# Patient Record
Sex: FEMALE | Race: WHITE | NOT HISPANIC OR LATINO | Employment: OTHER | URBAN - METROPOLITAN AREA
[De-identification: names, ages, dates, MRNs, and addresses within clinical notes are randomized per-mention and may not be internally consistent; named-entity substitution may affect disease eponyms.]

---

## 2017-04-20 ENCOUNTER — APPOINTMENT (OUTPATIENT)
Dept: WOMENS IMAGING | Facility: HOSPITAL | Age: 57
End: 2017-04-20

## 2017-04-20 PROCEDURE — 77067 SCR MAMMO BI INCL CAD: CPT | Performed by: RADIOLOGY

## 2017-04-20 PROCEDURE — 77063 BREAST TOMOSYNTHESIS BI: CPT | Performed by: RADIOLOGY

## 2018-08-06 ENCOUNTER — APPOINTMENT (OUTPATIENT)
Dept: WOMENS IMAGING | Facility: HOSPITAL | Age: 58
End: 2018-08-06

## 2018-08-06 PROCEDURE — 77067 SCR MAMMO BI INCL CAD: CPT | Performed by: RADIOLOGY

## 2018-08-06 PROCEDURE — 77063 BREAST TOMOSYNTHESIS BI: CPT | Performed by: RADIOLOGY

## 2019-04-08 ENCOUNTER — OFFICE VISIT (OUTPATIENT)
Dept: NEUROSURGERY | Facility: CLINIC | Age: 59
End: 2019-04-08

## 2019-04-08 VITALS
BODY MASS INDEX: 28.45 KG/M2 | SYSTOLIC BLOOD PRESSURE: 114 MMHG | DIASTOLIC BLOOD PRESSURE: 58 MMHG | WEIGHT: 177 LBS | TEMPERATURE: 98.1 F | HEIGHT: 66 IN

## 2019-04-08 DIAGNOSIS — M51.36 DEGENERATIVE DISC DISEASE, LUMBAR: Primary | ICD-10-CM

## 2019-04-08 PROBLEM — M51.369 DEGENERATIVE DISC DISEASE, LUMBAR: Status: ACTIVE | Noted: 2019-04-08

## 2019-04-08 PROCEDURE — 99203 OFFICE O/P NEW LOW 30 MIN: CPT | Performed by: NEUROLOGICAL SURGERY

## 2019-04-08 RX ORDER — NABUMETONE 750 MG/1
750 TABLET, FILM COATED ORAL 2 TIMES DAILY
Qty: 60 TABLET | Refills: 0 | Status: SHIPPED | OUTPATIENT
Start: 2019-04-08 | End: 2020-01-06

## 2019-04-08 RX ORDER — TRAZODONE HYDROCHLORIDE 150 MG/1
150 TABLET ORAL NIGHTLY
COMMUNITY

## 2019-04-08 RX ORDER — METHOCARBAMOL 750 MG/1
750 TABLET, FILM COATED ORAL 2 TIMES DAILY
Qty: 30 TABLET | Refills: 0 | Status: SHIPPED | OUTPATIENT
Start: 2019-04-08 | End: 2019-04-26 | Stop reason: SDUPTHER

## 2019-04-08 RX ORDER — ZOLEDRONIC ACID 5 MG/100ML
5 INJECTION, SOLUTION INTRAVENOUS ONCE
COMMUNITY

## 2019-04-08 RX ORDER — HYDROCODONE BITARTRATE AND ACETAMINOPHEN 7.5; 325 MG/1; MG/1
1 TABLET ORAL EVERY 6 HOURS PRN
COMMUNITY

## 2019-04-08 NOTE — PATIENT INSTRUCTIONS
After seeing Dr. Martinez:     Call Dr. Cheatham on a Monday or Tuesday with an update.    Ask for Tessa () and leave a message for  Dr. Cheatham.   He will call you back at the end of the day as soon as he can.     [phone]

## 2019-04-08 NOTE — PROGRESS NOTES
Lexi Martinez  1960  [id number]      Chief Complaint   Patient presents with   • Back Pain       HISTORY OF PRESENT ILLNESS:  [This is a 59-year-old female with chronic back problems.  She has had an exacerbation several months ago when she drove back from [place] with severe pain in her right hip rating to her right lower extremity.  Since that time she has been to physical therapy without improvement.  Diagnostic studies were performed and she is referred for neurosurgical consultation.  The description of the pain is nonradicular and that it is nondermatomal.]    Past Medical History:   Diagnosis Date   • Osteoporosis        Past Surgical History:   Procedure Laterality Date   • KNEE SURGERY Right        Family History   Problem Relation Age of Onset   • Cancer Mother    • Heart disease Father        Social History     Socioeconomic History   • Marital status:      Spouse name: Not on file   • Number of children: Not on file   • Years of education: Not on file   • Highest education level: Not on file   Tobacco Use   • Smoking status: Current Every Day Smoker     Packs/day: 1.00     Types: Cigarettes   • Smokeless tobacco: Never Used   Substance and Sexual Activity   • Alcohol use: No     Frequency: Never   • Drug use: No   • Sexual activity: Defer       Allergies   Allergen Reactions   • Macrobid [Nitrofurantoin Macrocrystal] Hives   • Ultram [Tramadol] GI Intolerance         Current Outpatient Medications:   •  HYDROcodone-acetaminophen (NORCO) 7.5-325 MG per tablet, Take 1 tablet by mouth Every 6 (Six) Hours As Needed for Moderate Pain ., Disp: , Rfl:   •  Pitavastatin Calcium (LIVALO) 4 MG tablet, Take  by mouth Every Night., Disp: , Rfl:   •  traZODone (DESYREL) 150 MG tablet, Take 150 mg by mouth Every Night., Disp: , Rfl:   •  zoledronic acid (RECLAST) 5 MG/100ML solution infusion, Infuse 5 mg into a venous catheter 1 (One) Time., Disp: , Rfl:     Review of Systems   Constitutional: Negative for  activity change, appetite change, chills, diaphoresis, fatigue, fever and unexpected weight change.   HENT: Negative for congestion, dental problem, drooling, ear discharge, ear pain, facial swelling, hearing loss, mouth sores, nosebleeds, postnasal drip, rhinorrhea, sinus pressure, sneezing, sore throat, tinnitus, trouble swallowing and voice change.    Eyes: Negative for photophobia, pain, discharge, redness, itching and visual disturbance.   Respiratory: Negative for apnea, cough, choking, chest tightness, shortness of breath, wheezing and stridor.    Cardiovascular: Negative for chest pain, palpitations and leg swelling.   Gastrointestinal: Negative for abdominal distention, abdominal pain, anal bleeding, blood in stool, constipation, diarrhea, nausea, rectal pain and vomiting.   Endocrine: Negative for cold intolerance, heat intolerance, polydipsia, polyphagia and polyuria.   Genitourinary: Negative for decreased urine volume, difficulty urinating, dysuria, enuresis, flank pain, frequency, genital sores, hematuria and urgency.   Musculoskeletal: Positive for back pain. Negative for arthralgias, gait problem, joint swelling, myalgias, neck pain and neck stiffness.   Skin: Negative for color change, pallor, rash and wound.   Allergic/Immunologic: Negative for environmental allergies, food allergies and immunocompromised state.   Neurological: Negative for dizziness, tremors, seizures, syncope, facial asymmetry, speech difficulty, weakness, light-headedness, numbness and headaches.   Hematological: Negative for adenopathy. Does not bruise/bleed easily.   Psychiatric/Behavioral: Negative for agitation, behavioral problems, confusion, decreased concentration, dysphoric mood, hallucinations, self-injury, sleep disturbance and suicidal ideas. The patient is not nervous/anxious and is not hyperactive.        Vitals:    04/08/19 1150   BP: 114/58   BP Location: Right arm   Patient Position: Sitting   Temp: 98.1 °F (36.7  "°C)   TempSrc: Temporal   Weight: 80.3 kg (177 lb)   Height: 167.6 cm (66\")       Neurological Examination:    Mental status/speech: The patient is alert and oriented.  Speech is clear without aphysia or dysarthria.  No overt cognitive deficits.    Cranial nerve examination:    Olfaction: Smell is intact.  Vision: Vision is intact without visual field abnormalities.  Funduscopic examination is normal.  No pupillary irregularity.  Ocular motor examination: The extraocular muscles are intact.  There is no diplopia.  The pupil is round and reactive to both light and accommodation.  There is no nystagmus.  Facial movement/sensation: There is no facial weakness.  Sensation is intact in the first, second, and third divisions of the trigeminal nerve.  The corneal reflex is intact.  Auditory: Hearing is intact to finger rub bilaterally.  Cranial nerves IX, X, XI, XII: Phonation is normal.  No dysphagia.  Tongue is protruded in the midline without atrophy.  The gag reflex is intact.  Shoulder shrug is normal.    Musculoligamentous ligamentous examination: Straight leg raising is negative.  She has pain with internal/external rotation of the right hip.  There is no weakness, sensory loss or reflex asymmetry.  Her balance is intact without ataxia or dysmetria.  [ ]          Medical Decision Making:     Diagnostic Data Set: Lumbar spine x-rays show significant degenerative disc disease L3-L4 and L4-L5.  Unfortunately the MRI was not sent.    Assessment:  [Symptomatic degenerative osteoarthritis]          Recommendations:  [We will request that the MRI be sent to us.  In interim I have given her prescription of Relafen 750 mg twice daily, Robaxin-750 milligrams at night and have asked her to see Dr. Martinez for an epidural steroid injection.  I will review her diagnostic studies and call her.        I greatly appreciate the opportunity to see and evaluate this individual.  If you have questions or concerns regarding issues that " I may have overlooked please call me at any time: [phone].  Tee Cheatham M.D.  [facility]  [address].  [place] [id number]

## 2019-04-22 ENCOUNTER — TELEPHONE (OUTPATIENT)
Dept: NEUROSURGERY | Facility: CLINIC | Age: 59
End: 2019-04-22

## 2019-04-22 ENCOUNTER — DOCUMENTATION (OUTPATIENT)
Dept: NEUROSURGERY | Facility: CLINIC | Age: 59
End: 2019-04-22

## 2019-04-22 NOTE — PROGRESS NOTES
Neurosurgery follow-up note:    I have had the opportunity of reviewing the MRI which the patient forwarded and sent to me.  It shows the presence of significant degenerative disc disease at L2-L3.  At this level she has Modic changes with closure of the lateral recess more so on the left than the right.    At L3-L4 likewise she has significant disc space narrowing and Modic changes and closure of the neuroforamen on the right side.  This is most likely the culprit given the symptoms are located on the right side.    She is to schedule to see Dr. Martinez.  I have asked her to call me after she sees Dr. Martinez.  If she does not show improvement I would recommend flexion-extension x-rays, EMG and NCV before consideration of surgical intervention.  She may well require interbody fusion and pedicle screw fixation.    The MRI have been returned to the patient.

## 2019-04-22 NOTE — TELEPHONE ENCOUNTER
Please call the patient and tell her Dr. Cheatham received the MRI on Friday, he has reviewed and plans to call her again. He tried to call over the weekend, he will call her. DC

## 2019-04-22 NOTE — TELEPHONE ENCOUNTER
Provider:  Linden  Caller: pt  Time of call:   10:52  Phone #:  [phone]  Surgery:  no  Surgery Date:    Last visit:   04/08/19  Next visit: None    SRINATH:         Reason for call:     Patient wants to know if Dr. Cheatham received her MRI from [place] last week?

## 2019-04-26 RX ORDER — METHOCARBAMOL 750 MG/1
TABLET, FILM COATED ORAL
Qty: 30 TABLET | Refills: 0 | Status: SHIPPED | OUTPATIENT
Start: 2019-04-26 | End: 2019-06-24 | Stop reason: SDUPTHER

## 2019-04-26 NOTE — TELEPHONE ENCOUNTER
Provider:  Linden  Caller:  Automated refill request  Surgery:  NA  Surgery Date:    Last visit:  04/08/19  Next visit:  NA    Reason for call:         Requested Prescriptions     Pending Prescriptions Disp Refills   • methocarbamol (ROBAXIN) 750 MG tablet [Pharmacy Med Name: METHOCARBAMOL 750 MG TABLET] 30 tablet 0     Sig: TAKE 1 TABLET BY MOUTH TWICE A DAY

## 2019-06-24 DIAGNOSIS — M51.36 DEGENERATIVE DISC DISEASE, LUMBAR: Primary | ICD-10-CM

## 2019-06-24 RX ORDER — METHOCARBAMOL 750 MG/1
TABLET, FILM COATED ORAL
Qty: 30 TABLET | Refills: 0 | Status: SHIPPED | OUTPATIENT
Start: 2019-06-24 | End: 2019-07-09 | Stop reason: SDUPTHER

## 2019-06-24 NOTE — TELEPHONE ENCOUNTER
Provider:  Linden  Caller:  Automated refill request  Last visit:  04/08/19  Next visit:  NA     Reason for call:         Automated refill request.

## 2019-07-09 DIAGNOSIS — M51.36 DEGENERATIVE DISC DISEASE, LUMBAR: ICD-10-CM

## 2019-07-09 RX ORDER — METHOCARBAMOL 750 MG/1
TABLET, FILM COATED ORAL
Qty: 30 TABLET | Refills: 0 | Status: SHIPPED | OUTPATIENT
Start: 2019-07-09 | End: 2019-07-21 | Stop reason: SDUPTHER

## 2019-07-09 NOTE — TELEPHONE ENCOUNTER
Provider:  Linden  Caller:  Automated refill request  Surgery:  NA  Surgery Date:  NA  Last visit:  4/8/2019  Next visit: 8/15/2019    Reason for call:         Requested Prescriptions     Pending Prescriptions Disp Refills   • methocarbamol (ROBAXIN) 750 MG tablet [Pharmacy Med Name: METHOCARBAMOL 750 MG TABLET] 30 tablet 0     Sig: TAKE 1 TABLET BY MOUTH TWICE A DAY

## 2019-07-21 DIAGNOSIS — M51.36 DEGENERATIVE DISC DISEASE, LUMBAR: ICD-10-CM

## 2019-07-22 RX ORDER — METHOCARBAMOL 750 MG/1
TABLET, FILM COATED ORAL
Qty: 30 TABLET | Refills: 0 | Status: SHIPPED | OUTPATIENT
Start: 2019-07-22 | End: 2019-08-17 | Stop reason: SDUPTHER

## 2019-08-07 ENCOUNTER — APPOINTMENT (OUTPATIENT)
Dept: WOMENS IMAGING | Facility: HOSPITAL | Age: 59
End: 2019-08-07

## 2019-08-07 PROCEDURE — 77067 SCR MAMMO BI INCL CAD: CPT | Performed by: RADIOLOGY

## 2019-08-07 PROCEDURE — 77063 BREAST TOMOSYNTHESIS BI: CPT | Performed by: RADIOLOGY

## 2019-08-15 ENCOUNTER — OFFICE VISIT (OUTPATIENT)
Dept: NEUROSURGERY | Facility: CLINIC | Age: 59
End: 2019-08-15

## 2019-08-15 VITALS
TEMPERATURE: 98 F | DIASTOLIC BLOOD PRESSURE: 67 MMHG | WEIGHT: 179 LBS | OXYGEN SATURATION: 98 % | HEIGHT: 66 IN | SYSTOLIC BLOOD PRESSURE: 106 MMHG | HEART RATE: 82 BPM | BODY MASS INDEX: 28.77 KG/M2 | RESPIRATION RATE: 20 BRPM

## 2019-08-15 DIAGNOSIS — M51.36 DEGENERATIVE DISC DISEASE, LUMBAR: Primary | ICD-10-CM

## 2019-08-15 PROCEDURE — 99213 OFFICE O/P EST LOW 20 MIN: CPT | Performed by: NEUROLOGICAL SURGERY

## 2019-08-15 RX ORDER — ESTRADIOL 0.1 MG/G
CREAM VAGINAL
Refills: 12 | COMMUNITY
Start: 2019-07-01

## 2019-08-15 RX ORDER — SULFAMETHOXAZOLE AND TRIMETHOPRIM 800; 160 MG/1; MG/1
TABLET ORAL
Refills: 3 | COMMUNITY
Start: 2019-07-31

## 2019-08-15 NOTE — PROGRESS NOTES
Lexi Martinez  1960  [id number]                        CHIEF COMPLAINT: Persistent right hip and leg pain         MEDICAL HISTORY SINCE LAST ENCOUNTER: She has shown no improvement with physical therapy NSAIDs and epidural steroid injection.  Continues to complain of severe pain in her back rating to her right hip.  MRI shows multilevel degenerative disc disease with no evidence of instability, however.           Past Medical History:   Diagnosis Date   • Osteoporosis               Past Surgical History:   Procedure Laterality Date   • KNEE SURGERY Right               Family History   Problem Relation Age of Onset   • Cancer Mother    • Heart disease Father               Social History     Socioeconomic History   • Marital status:      Spouse name: Not on file   • Number of children: Not on file   • Years of education: Not on file   • Highest education level: Not on file   Tobacco Use   • Smoking status: Current Every Day Smoker     Packs/day: 1.00     Types: Cigarettes   • Smokeless tobacco: Never Used   Substance and Sexual Activity   • Alcohol use: No     Frequency: Never   • Drug use: No   • Sexual activity: Defer              Allergies   Allergen Reactions   • Macrobid [Nitrofurantoin Macrocrystal] Hives   • Ultram [Tramadol] GI Intolerance              Current Outpatient Medications:   •  estradiol (ESTRACE) 0.1 MG/GM vaginal cream, INSERT 1 GRAM IN VAGINA AT BEDTIME TWICE A WEEK AS DIRECTED, Disp: , Rfl: 12  •  HYDROcodone-acetaminophen (NORCO) 7.5-325 MG per tablet, Take 1 tablet by mouth Every 6 (Six) Hours As Needed for Moderate Pain ., Disp: , Rfl:   •  methocarbamol (ROBAXIN) 750 MG tablet, TAKE 1 TABLET BY MOUTH TWICE A DAY, Disp: 30 tablet, Rfl: 0  •  nabumetone (RELAFEN) 750 MG tablet, Take 1 tablet by mouth 2 (Two) Times a Day., Disp: 60 tablet, Rfl: 0  •  Pitavastatin Calcium (LIVALO) 4 MG tablet, Take  by mouth Every Night., Disp: , Rfl:   •  sulfamethoxazole-trimethoprim (BACTRIM  "DS,SEPTRA DS) 800-160 MG per tablet, TAKE AS DIRECTED WITH INTERCOURSE, Disp: , Rfl: 3  •  traZODone (DESYREL) 150 MG tablet, Take 150 mg by mouth Every Night., Disp: , Rfl:   •  zoledronic acid (RECLAST) 5 MG/100ML solution infusion, Infuse 5 mg into a venous catheter 1 (One) Time., Disp: , Rfl:          Review of Systems   Musculoskeletal: Positive for arthralgias and back pain.   All other systems reviewed and are negative.              Vitals:    08/15/19 1531   BP: 106/67   BP Location: Right arm   Patient Position: Sitting   Pulse: 82   Resp: 20   Temp: 98 °F (36.7 °C)   SpO2: 98%   Weight: 81.2 kg (179 lb)   Height: 167.6 cm (66\")               EXAMINATION: Limitation of range of motion lumbar spine.  Mildly positive Chuck sign on the right.  No weakness sensory loss or reflex asymmetry.            MEDICAL DECISION MAKING: Degenerative osteoarthritis of the lumbar spine           ASSESSMENT/DISPOSITION: She remains symptomatic and we have made no headway on improving her pain.  I think she needs further studies.  This will include myelography, post myelography CT scan, MRI of her right hip and EMG and NCV of the right lower extremity.  Hopefully this will provide an explanation that would lend itself to surgical intervention.  Otherwise there is very little to do for her.  I will keep you informed.              I APPRECIATE THE OPPORTUNITY OF THIS REFERRAL. PLEASE CALL IF ANY       QUESTIONS [phone]  Scribed for Gilbert Cheatham MD by Celeste Topete CMA. 8/15/2019  3:49 PM  I have read and concur with the information provided by the scribe.  Gilbert Cheatham MD    "

## 2019-08-17 DIAGNOSIS — M51.36 DEGENERATIVE DISC DISEASE, LUMBAR: ICD-10-CM

## 2019-08-19 RX ORDER — METHOCARBAMOL 750 MG/1
TABLET, FILM COATED ORAL
Qty: 30 TABLET | Refills: 0 | Status: SHIPPED | OUTPATIENT
Start: 2019-08-19 | End: 2019-09-05 | Stop reason: SDUPTHER

## 2019-09-05 DIAGNOSIS — M51.36 DEGENERATIVE DISC DISEASE, LUMBAR: ICD-10-CM

## 2019-09-05 RX ORDER — METHOCARBAMOL 750 MG/1
TABLET, FILM COATED ORAL
Qty: 30 TABLET | Refills: 0 | Status: SHIPPED | OUTPATIENT
Start: 2019-09-05 | End: 2019-10-16 | Stop reason: SDUPTHER

## 2019-09-05 NOTE — TELEPHONE ENCOUNTER
Provider:  Linden  Caller:  Automated refill request  Surgery:  NA  Surgery Date:    Last visit:  08/15/19  Next visit: NA    Reason for call:         Requested Prescriptions     Pending Prescriptions Disp Refills   • methocarbamol (ROBAXIN) 750 MG tablet [Pharmacy Med Name: METHOCARBAMOL 750 MG TABLET] 30 tablet 0     Sig: TAKE 1 TABLET BY MOUTH TWICE A DAY

## 2019-10-16 DIAGNOSIS — M51.36 DEGENERATIVE DISC DISEASE, LUMBAR: ICD-10-CM

## 2019-10-16 RX ORDER — METHOCARBAMOL 750 MG/1
TABLET, FILM COATED ORAL
Qty: 30 TABLET | Refills: 0 | Status: SHIPPED | OUTPATIENT
Start: 2019-10-16

## 2019-10-16 NOTE — TELEPHONE ENCOUNTER
Provider:  Linden   Caller:  Automated refill request  Surgery:  Na   Surgery Date:  Na   Last visit:  8/15/19  Next visit: tbd    Reason for call:         Requested Prescriptions     Pending Prescriptions Disp Refills   • methocarbamol (ROBAXIN) 750 MG tablet [Pharmacy Med Name: METHOCARBAMOL 750 MG TABLET] 30 tablet 0     Sig: TAKE 1 TABLET BY MOUTH TWICE A DAY

## 2019-12-10 ENCOUNTER — HOSPITAL ENCOUNTER (OUTPATIENT)
Dept: MRI IMAGING | Facility: HOSPITAL | Age: 59
Discharge: HOME | End: 2019-12-10
Admitting: NEUROLOGICAL SURGERY

## 2019-12-10 DIAGNOSIS — M51.36 DEGENERATIVE DISC DISEASE, LUMBAR: ICD-10-CM

## 2019-12-10 PROCEDURE — 73721 MRI JNT OF LWR EXTRE W/O DYE: CPT

## 2019-12-10 PROCEDURE — 73721 MRI JNT OF LWR EXTRE W/O DYE: CPT | Performed by: RADIOLOGY

## 2019-12-18 ENCOUNTER — HOSPITAL ENCOUNTER (OUTPATIENT)
Dept: GENERAL RADIOLOGY | Facility: HOSPITAL | Age: 59
Discharge: HOME | End: 2019-12-18

## 2019-12-18 ENCOUNTER — HOSPITAL ENCOUNTER (OUTPATIENT)
Dept: NEUROLOGY | Facility: HOSPITAL | Age: 59
Discharge: HOME | End: 2019-12-18
Admitting: NEUROLOGICAL SURGERY

## 2019-12-18 ENCOUNTER — HOSPITAL ENCOUNTER (OUTPATIENT)
Dept: CT IMAGING | Facility: HOSPITAL | Age: 59
Discharge: HOME | End: 2019-12-18

## 2019-12-18 VITALS
HEIGHT: 66 IN | SYSTOLIC BLOOD PRESSURE: 126 MMHG | WEIGHT: 181.8 LBS | DIASTOLIC BLOOD PRESSURE: 66 MMHG | BODY MASS INDEX: 29.22 KG/M2 | OXYGEN SATURATION: 94 % | HEART RATE: 73 BPM | RESPIRATION RATE: 18 BRPM | TEMPERATURE: 98.4 F

## 2019-12-18 DIAGNOSIS — M51.36 DEGENERATIVE DISC DISEASE, LUMBAR: ICD-10-CM

## 2019-12-18 PROCEDURE — 72132 CT LUMBAR SPINE W/DYE: CPT

## 2019-12-18 PROCEDURE — 62304 MYELOGRAPHY LUMBAR INJECTION: CPT

## 2019-12-18 PROCEDURE — 63710000001 DIAZEPAM 5 MG TABLET: Performed by: NEUROLOGICAL SURGERY

## 2019-12-18 PROCEDURE — 95886 MUSC TEST DONE W/N TEST COMP: CPT

## 2019-12-18 PROCEDURE — A9270 NON-COVERED ITEM OR SERVICE: HCPCS | Performed by: NEUROLOGICAL SURGERY

## 2019-12-18 PROCEDURE — 0 IOPAMIDOL 41 % SOLUTION: Performed by: NEUROLOGICAL SURGERY

## 2019-12-18 PROCEDURE — 95909 NRV CNDJ TST 5-6 STUDIES: CPT

## 2019-12-18 PROCEDURE — 72240 MYELOGRAPHY NECK SPINE: CPT

## 2019-12-18 PROCEDURE — 63710000001 DIPHENHYDRAMINE PER 50 MG: Performed by: NEUROLOGICAL SURGERY

## 2019-12-18 PROCEDURE — 25010000003 LIDOCAINE 1 % SOLUTION: Performed by: PHYSICIAN ASSISTANT

## 2019-12-18 RX ORDER — LIDOCAINE HYDROCHLORIDE 10 MG/ML
5 INJECTION, SOLUTION INFILTRATION; PERINEURAL ONCE
Status: COMPLETED | OUTPATIENT
Start: 2019-12-18 | End: 2019-12-18

## 2019-12-18 RX ORDER — DIPHENHYDRAMINE HCL 50 MG
50 CAPSULE ORAL ONCE
Status: COMPLETED | OUTPATIENT
Start: 2019-12-18 | End: 2019-12-18

## 2019-12-18 RX ORDER — DIAZEPAM 5 MG/1
10 TABLET ORAL ONCE
Status: COMPLETED | OUTPATIENT
Start: 2019-12-18 | End: 2019-12-18

## 2019-12-18 RX ADMIN — LIDOCAINE HYDROCHLORIDE 5 ML: 10 INJECTION, SOLUTION INFILTRATION; PERINEURAL at 09:40

## 2019-12-18 RX ADMIN — DIAZEPAM 10 MG: 5 TABLET ORAL at 08:39

## 2019-12-18 RX ADMIN — DIPHENHYDRAMINE HYDROCHLORIDE 50 MG: 50 CAPSULE ORAL at 08:39

## 2019-12-18 RX ADMIN — IOPAMIDOL 20 ML: 408 INJECTION, SOLUTION INTRATHECAL at 09:45

## 2019-12-18 NOTE — POST-PROCEDURE NOTE
Radiology Procedure    Pre-procedure: procedure, risks discussed with patient. Patient indicated understanding and consented to procedure.     Procedure Performed: lumbar myelogram     IV Sedation and/or Anesthesia:  No    Complications: none    Preliminary Findings: pending    Specimen Removed: 0    Estimated Blood Loss:  0ml    Post-Procedure Diagnosis: pending    Post-Procedure Plan: ct L spine, encourage fluids, bed rest x 2 hours    Standard Discharge Instructions Given:yes     VICENTE Cervantes  12/18/19  9:46 AM

## 2019-12-18 NOTE — NURSING NOTE
Pt here for lumbar myelogram today. Pt tolerated procedure well, and is not having any pain after procedure. Pt completed 2 hours of bed rest, but patient declines staying any longer to wait for Dr Cheatham. Pt would prefer to speak with him over the phone at his convenience. Patient verbalizes understanding of discharge instructions, and the importance of resting today and drinking plenty of fluids. No questions at this time, pt discharged via wheelchair.

## 2019-12-19 ENCOUNTER — TELEPHONE (OUTPATIENT)
Dept: INFUSION THERAPY | Facility: HOSPITAL | Age: 59
End: 2019-12-19

## 2020-01-02 ENCOUNTER — TELEPHONE (OUTPATIENT)
Dept: NEUROSURGERY | Facility: CLINIC | Age: 60
End: 2020-01-02

## 2020-01-02 NOTE — TELEPHONE ENCOUNTER
Provider:Linden   Caller: pk   Time of call:5505     Phone #:[phone]     Last visit: 08/15/19    Next visit:     Reason for call:  Pt would like someone to go over the results of her myelogram and EMG with her.

## 2020-01-06 ENCOUNTER — DOCUMENTATION (OUTPATIENT)
Dept: NEUROSURGERY | Facility: CLINIC | Age: 60
End: 2020-01-06

## 2020-01-06 NOTE — PROGRESS NOTES
"Review of her studies indicate that she has marked disc space narrowing L4-L5.  The predominance of her pain is local in the lumbar region and occasionally rating into the right leg.  She has \"good and bad days\".    Unfortunately the issues that she has are structural which would require an a LIF or PLIF L4-L5.  She will call me in 1 month and we will discuss this further.  I would certainly prefer to treat her conservatively if at all possible  "

## 2020-11-02 ENCOUNTER — APPOINTMENT (OUTPATIENT)
Dept: WOMENS IMAGING | Facility: HOSPITAL | Age: 60
End: 2020-11-02

## 2020-11-02 PROCEDURE — 77067 SCR MAMMO BI INCL CAD: CPT | Performed by: RADIOLOGY

## 2020-11-02 PROCEDURE — 77063 BREAST TOMOSYNTHESIS BI: CPT | Performed by: RADIOLOGY

## 2021-05-12 ENCOUNTER — TELEPHONE (OUTPATIENT)
Dept: NEUROSURGERY | Facility: CLINIC | Age: 61
End: 2021-05-12

## 2021-05-12 NOTE — TELEPHONE ENCOUNTER
Provider: TESSA  Caller: FE  Relationship to Patient: SELF    Phone Number: [phone](H)  [phone](c)  Reason for Call: PT CALLED TO SEE WHO SHOULD CONTINUE CARE FOR HER AND HER  DUE TO DR ZARATE snf  PLEASE CALL PT TO ADVISE   When was the patient last seen:8/2019

## 2021-05-12 NOTE — TELEPHONE ENCOUNTER
"Provider:  Radha ZHANG  Caller: patient  Time of call:   1:23  Phone #:  [phone]  Surgery:  no  Surgery Date:    Last visit:   08/15/19  Next visit:     SRINATH:         Reason for call:   Jonathan wants to know who is going to take over her care?  Has not been seen since 2019.     Dr Cheatham last note:08-15-19  She remains symptomatic and we have made no headway on improving her pain.  I think she needs further studies. This will include myelography, post myelography CT scan, MRI of her right hip and EMG and NCV of the right lower extremity.  Hopefully this will provide an explanation that would lend itself to surgical intervention.  Otherwise there is very little to do for her.  I will keep you informed.    Documentation from 01/06/20  Review of her studies indicate that she has marked disc space narrowing L4-L5.  The predominance of her pain is local in the lumbar region and occasionally rating into the right leg.  She has \"good and bad days\".     Unfortunately the issues that she has are structural which would require an a LIF or PLIF L4-L5.  She will call me in 1 month and we will discuss this further.  I would certainly prefer to treat her conservatively if at all possible     "

## 2021-12-08 ENCOUNTER — APPOINTMENT (OUTPATIENT)
Dept: WOMENS IMAGING | Facility: HOSPITAL | Age: 61
End: 2021-12-08

## 2021-12-08 PROCEDURE — 77067 SCR MAMMO BI INCL CAD: CPT | Performed by: RADIOLOGY

## 2021-12-08 PROCEDURE — 77063 BREAST TOMOSYNTHESIS BI: CPT | Performed by: RADIOLOGY

## 2022-12-12 ENCOUNTER — APPOINTMENT (OUTPATIENT)
Dept: WOMENS IMAGING | Facility: HOSPITAL | Age: 62
End: 2022-12-12

## 2022-12-12 PROCEDURE — 77063 BREAST TOMOSYNTHESIS BI: CPT | Performed by: RADIOLOGY

## 2022-12-12 PROCEDURE — 77067 SCR MAMMO BI INCL CAD: CPT | Performed by: RADIOLOGY

## 2023-02-17 ENCOUNTER — APPOINTMENT (OUTPATIENT)
Dept: WOMENS IMAGING | Facility: HOSPITAL | Age: 63
End: 2023-02-17
Payer: MEDICARE

## 2023-02-17 PROCEDURE — 76942 ECHO GUIDE FOR BIOPSY: CPT | Performed by: RADIOLOGY

## 2023-02-17 PROCEDURE — 19000 PUNCTURE ASPIR CYST BREAST: CPT | Performed by: RADIOLOGY

## 2024-10-11 ENCOUNTER — TRANSCRIBE ORDERS (OUTPATIENT)
Dept: ADMINISTRATIVE | Facility: HOSPITAL | Age: 64
End: 2024-10-11
Payer: MEDICARE

## 2024-10-11 DIAGNOSIS — M54.50 LOW BACK PAIN, UNSPECIFIED BACK PAIN LATERALITY, UNSPECIFIED CHRONICITY, UNSPECIFIED WHETHER SCIATICA PRESENT: Primary | ICD-10-CM

## 2024-12-10 ENCOUNTER — TRANSCRIBE ORDERS (OUTPATIENT)
Dept: ADMINISTRATIVE | Facility: HOSPITAL | Age: 64
End: 2024-12-10
Payer: MEDICARE

## 2024-12-10 DIAGNOSIS — M51.362 DEGENERATION OF INTERVERTEBRAL DISC OF LUMBAR REGION WITH DISCOGENIC BACK PAIN AND LOWER EXTREMITY PAIN: Primary | ICD-10-CM

## 2025-01-09 ENCOUNTER — TRANSCRIBE ORDERS (OUTPATIENT)
Dept: ADMINISTRATIVE | Facility: HOSPITAL | Age: 65
End: 2025-01-09
Payer: MEDICARE

## 2025-01-09 DIAGNOSIS — M51.369 OTHER INTERVERTEBRAL DISC DEGENERATION OF LUMBAR REGION WITHOUT LUMBAR BACK PAIN OR LOWER EXTREMITY PAIN: Primary | ICD-10-CM

## 2025-05-27 ENCOUNTER — TRANSCRIBE ORDERS (OUTPATIENT)
Dept: ADMINISTRATIVE | Facility: HOSPITAL | Age: 65
End: 2025-05-27
Payer: MEDICARE

## 2025-05-27 DIAGNOSIS — M51.362 OTHER INTERVERTEBRAL DISC DEGENERATION, LUMBAR REGION WITH DISCOGENIC BACK PAIN AND LOWER EXTREMITY PAIN: Primary | ICD-10-CM

## 2025-06-04 ENCOUNTER — HOSPITAL ENCOUNTER (OUTPATIENT)
Dept: MRI IMAGING | Facility: HOSPITAL | Age: 65
Discharge: HOME | End: 2025-06-04
Payer: MEDICARE

## 2025-06-04 DIAGNOSIS — M51.362 OTHER INTERVERTEBRAL DISC DEGENERATION, LUMBAR REGION WITH DISCOGENIC BACK PAIN AND LOWER EXTREMITY PAIN: ICD-10-CM

## 2025-06-04 PROCEDURE — 72148 MRI LUMBAR SPINE W/O DYE: CPT
